# Patient Record
(demographics unavailable — no encounter records)

---

## 2024-12-03 NOTE — HISTORY OF PRESENT ILLNESS
[de-identified] : Patient presents today c/o coughing , sensation of something stuck in throat, Hiatal hernia repair surgery history with significant improvement of severe GERD - now currently not on PPI or GERD medications.  Endorses recurrent tonsil stones  without tonsillitis.  Patient reports recurrent cough and throat clearing with poor vocal stamina for extended duration during her work as a . since procedure this past year.  Had recent MBS following above procedure that patients reports was normal.  She denies issues with eating and drinking. Previous surgery information: [place]: Leo gale.  July 22 of 2024 - Hiatal hernia repair

## 2024-12-03 NOTE — REASON FOR VISIT
[Initial Evaluation] : an initial evaluation for [FreeTextEntry2] : coughing , sensation of something stuck in throat , TIF surgery history , tonsil stones

## 2024-12-03 NOTE — PROCEDURE
[de-identified] :   Indication: recurrent cough Procedure: Flexible fiberoptic laryngoscopy. Verbal consent was performed including discussion of risks, benefits, alternatives and answering questions to patient's/parent's satisfaction. The nares were topicalized with 2 sprays of lidocaine 2% and oxymetazoline to bilateral nares. The flexible scope was passed through the left naris. Sequential examination of upper airway anatomical sites performed including: nasal cavity, nasopharynx, oropharynx, hypopharynx, larynx and subglottic triangle. Photo documentation was obtained.   Findings: Nasal cavity: mild edema, no masses, no bleeding, osteomeatal complex clear, sphenoethmoid recess clear.  Choana: clear Oropharynx: bilateral tonsil size: 3.  Posterior pharyngeal wall: + post-nasal drip. Vallecula: clear Pyriform sinuses: clear bilaterally. Larynx:  Epiglottis: sharp margins, no edema.  Arytenoid: no prolapse, no edema.  Aryepiglottic folds: no shortening.  False vocal folds: no edema or lesions; not obstructing view of true vocal folds on phonation.  True vocal folds: no edema or lesions.  Vocal Fold Mobility: fully mobile bilaterally.  Subglottic triangle: patent The patient tolerated the procedure well without complications.

## 2024-12-03 NOTE — HISTORY OF PRESENT ILLNESS
[de-identified] : Patient presents today c/o coughing , sensation of something stuck in throat, Hiatal hernia repair surgery history with significant improvement of severe GERD - now currently not on PPI or GERD medications.  Endorses recurrent tonsil stones  without tonsillitis.  Patient reports recurrent cough and throat clearing with poor vocal stamina for extended duration during her work as a . since procedure this past year.  Had recent MBS following above procedure that patients reports was normal.  She denies issues with eating and drinking. Previous surgery information: [place]: Leo gale.  July 22 of 2024 - Hiatal hernia repair

## 2024-12-03 NOTE — PROCEDURE
[de-identified] :   Indication: recurrent cough Procedure: Flexible fiberoptic laryngoscopy. Verbal consent was performed including discussion of risks, benefits, alternatives and answering questions to patient's/parent's satisfaction. The nares were topicalized with 2 sprays of lidocaine 2% and oxymetazoline to bilateral nares. The flexible scope was passed through the left naris. Sequential examination of upper airway anatomical sites performed including: nasal cavity, nasopharynx, oropharynx, hypopharynx, larynx and subglottic triangle. Photo documentation was obtained.   Findings: Nasal cavity: mild edema, no masses, no bleeding, osteomeatal complex clear, sphenoethmoid recess clear.  Choana: clear Oropharynx: bilateral tonsil size: 3.  Posterior pharyngeal wall: + post-nasal drip. Vallecula: clear Pyriform sinuses: clear bilaterally. Larynx:  Epiglottis: sharp margins, no edema.  Arytenoid: no prolapse, no edema.  Aryepiglottic folds: no shortening.  False vocal folds: no edema or lesions; not obstructing view of true vocal folds on phonation.  True vocal folds: no edema or lesions.  Vocal Fold Mobility: fully mobile bilaterally.  Subglottic triangle: patent The patient tolerated the procedure well without complications.

## 2024-12-03 NOTE — ASSESSMENT
[FreeTextEntry1] : Recurrent cough, dysphonia, tonsiliths.  FFL done - overall normal. Conservative tonsilith management discussed - Use waterpick, salt water garggles, hydration.  Refer to SLP Elder Esteban for voice therapy.  Follow up with me in 6 months.     Total time spent on patient encounter including review of patient's medical history, physical examination, interpretation of any indicated labs / imaging and counseling, excluding time spent performing any indicated procedures: 30-44 minutes.    Getachew Salgado MD, MPH Director of Pediatric Otolaryngology [facility]

## 2024-12-03 NOTE — PHYSICAL EXAM
[Midline] : trachea located in midline position [Laryngoscopy Performed] : laryngoscopy was performed, see procedure section for findings [de-identified] : 3+, cryptic, non erytthematous, no visible tonsiliths.  [Normal] : palpation of lymph nodes is normal

## 2024-12-03 NOTE — ASSESSMENT
[FreeTextEntry1] : Recurrent cough, dysphonia, tonsiliths.  FFL done - overall normal. Conservative tonsilith management discussed - Use waterpick, salt water garggles, hydration.  Refer to SLP Elder Esteban for voice therapy.  Follow up with me in 6 months.     Total time spent on patient encounter including review of patient's medical history, physical examination, interpretation of any indicated labs / imaging and counseling, excluding time spent performing any indicated procedures: 30-44 minutes.    Getachew Salgado MD, MPH Director of Pediatric Otolaryngology [facility]

## 2024-12-03 NOTE — PHYSICAL EXAM
[Midline] : trachea located in midline position [Laryngoscopy Performed] : laryngoscopy was performed, see procedure section for findings [de-identified] : 3+, cryptic, non erytthematous, no visible tonsiliths.  [Normal] : palpation of lymph nodes is normal